# Patient Record
(demographics unavailable — no encounter records)

---

## 2025-02-12 NOTE — HISTORY OF PRESENT ILLNESS
[de-identified] :  Mr. Vickers is a 34 year old male who comes in for evaluation for LEFT knee injury that occurred on February 1, 2025 when he was playing basketball when he jumped up and then landed awkwardly on his left leg and felt a pop in his knee and twisted the left ankle and fell down with his knee underneath him.  Initial pain was about 7 out of 10.  There was not any swelling.  He got a cane.  He has been resting a lot and he works from home so has not walked a lot.  He has discomfort with lateral movement and kneeling or crawling.  Pain currently is about 2-3 out of 10.  No prior knee injuries. He never took any medication for it and did not ice or do anything else and has not seen anyone or had treatment.  No prior knee issues.

## 2025-02-12 NOTE — PHYSICAL EXAM
[Slightly Antalgic] : slightly antalgic [Cane] : ambulates with cane [de-identified] : LEFT knee Mildly antalgic gait. Tender posterior medial joint line. Ia Lachman.  Negative anterior and posterior drawer.  Normal varus and valgus laxity and rotational laxity. Pain with Fanny. Intact extensor mechanism. There may be slight effusion. Range of motion is with 5 degrees hyperextension and flexion to 130 degrees with mild pain at full flexion. Normal neurovascular exam distally Ankle is nontender and normal exam [de-identified] :  X-rays ordered, performed and reviewed today of LEFT knee weightbearing 4 views showed normal joint space.  No arthritis or fractures.  On the lateral tibia there is a bone protrusion that may be consistent with an osteochondroma

## 2025-02-12 NOTE — PHYSICAL EXAM
[Slightly Antalgic] : slightly antalgic [Cane] : ambulates with cane [de-identified] : LEFT knee Mildly antalgic gait. Tender posterior medial joint line. Ia Lachman.  Negative anterior and posterior drawer.  Normal varus and valgus laxity and rotational laxity. Pain with Fanny. Intact extensor mechanism. There may be slight effusion. Range of motion is with 5 degrees hyperextension and flexion to 130 degrees with mild pain at full flexion. Normal neurovascular exam distally Ankle is nontender and normal exam [de-identified] :  X-rays ordered, performed and reviewed today of LEFT knee weightbearing 4 views showed normal joint space.  No arthritis or fractures.  On the lateral tibia there is a bone protrusion that may be consistent with an osteochondroma

## 2025-02-12 NOTE — ASSESSMENT
[FreeTextEntry1] : 33 y/o male injured his knee playing basketball about 12 days ago and felt a pop in his knee as it buckled.  His ACL feels stable but he could have a partial tear. I suspect a posterior medial meniscus tear, possibly root tear. I referred him for an MRI. In the meantime I gave him exercises to do range of motion and do straight leg raises and some pain-free strengthening.  He can walk with a cane as needed for pain in the meantime.  Ice and ibuprofen if needed.  I will call him with MRI results and further recommendations.  Follow-up also in about 2 weeks to review.

## 2025-02-12 NOTE — HISTORY OF PRESENT ILLNESS
[de-identified] :  Mr. Vicekrs is a 34 year old male who comes in for evaluation for LEFT knee injury that occurred on February 1, 2025 when he was playing basketball when he jumped up and then landed awkwardly on his left leg and felt a pop in his knee and twisted the left ankle and fell down with his knee underneath him.  Initial pain was about 7 out of 10.  There was not any swelling.  He got a cane.  He has been resting a lot and he works from home so has not walked a lot.  He has discomfort with lateral movement and kneeling or crawling.  Pain currently is about 2-3 out of 10.  No prior knee injuries. He never took any medication for it and did not ice or do anything else and has not seen anyone or had treatment.  No prior knee issues.

## 2025-02-12 NOTE — ASSESSMENT
[FreeTextEntry1] : 33 y/o male injured his knee playing basketball about 12 days ago and felt a pop in his knee as it buckled.  His ACL feels stable but he could have a partial tear. I suspect a posterior medial meniscus tear, possibly root tear. I referred him for an MRI. In the meantime I gave him exercises to do range of motion and do straight leg raises and some pain-free strengthening.  He can walk with a cane as needed for pain in the meantime.  Ice and ibuprofen if needed.  I will call him with MRI results and further recommendations.  Follow-up also in about 2 weeks to review. Hydroxychloroquine Pregnancy And Lactation Text: This medication has been shown to cause fetal harm but it isn't assigned a Pregnancy Risk Category. There are small amounts excreted in breast milk.